# Patient Record
Sex: FEMALE | Race: OTHER | Employment: UNEMPLOYED | URBAN - METROPOLITAN AREA
[De-identification: names, ages, dates, MRNs, and addresses within clinical notes are randomized per-mention and may not be internally consistent; named-entity substitution may affect disease eponyms.]

---

## 2019-05-02 ENCOUNTER — HOSPITAL ENCOUNTER (OUTPATIENT)
Dept: CT IMAGING | Age: 49
Discharge: HOME | End: 2019-05-02
Payer: COMMERCIAL

## 2019-05-02 DIAGNOSIS — R51.9 CHRONIC HEADACHES: ICD-10-CM

## 2019-05-02 DIAGNOSIS — G89.29 CHRONIC HEADACHES: ICD-10-CM

## 2019-05-02 PROCEDURE — 70450 CT HEAD/BRAIN W/O DYE: CPT

## 2021-10-17 ENCOUNTER — HOSPITAL ENCOUNTER (OUTPATIENT)
Dept: PREADMISSION TESTING | Age: 51
Discharge: HOME | End: 2021-10-17
Payer: COMMERCIAL

## 2021-10-17 LAB — SARS-COV-2, COV2: NORMAL

## 2021-10-17 PROCEDURE — U0005 INFEC AGEN DETEC AMPLI PROBE: HCPCS

## 2021-10-18 LAB
SARS-COV-2, XPLCVT: NOT DETECTED
SOURCE, COVRS: NORMAL

## 2021-10-21 ENCOUNTER — ANESTHESIA (OUTPATIENT)
Dept: SURGERY | Age: 51
End: 2021-10-21
Payer: COMMERCIAL

## 2021-10-21 ENCOUNTER — HOSPITAL ENCOUNTER (OUTPATIENT)
Age: 51
Discharge: HOME | End: 2021-10-21
Attending: SURGERY | Admitting: SURGERY
Payer: COMMERCIAL

## 2021-10-21 ENCOUNTER — ANESTHESIA EVENT (OUTPATIENT)
Dept: SURGERY | Age: 51
End: 2021-10-21
Payer: COMMERCIAL

## 2021-10-21 VITALS
TEMPERATURE: 98.2 F | RESPIRATION RATE: 16 BRPM | HEART RATE: 58 BPM | DIASTOLIC BLOOD PRESSURE: 84 MMHG | SYSTOLIC BLOOD PRESSURE: 126 MMHG | BODY MASS INDEX: 26.43 KG/M2 | OXYGEN SATURATION: 98 % | WEIGHT: 140 LBS | HEIGHT: 61 IN

## 2021-10-21 PROBLEM — D49.2 SKIN NEOPLASM: Status: ACTIVE | Noted: 2021-10-21

## 2021-10-21 LAB — HCG UR QL: NEGATIVE

## 2021-10-21 PROCEDURE — 2709999900 HC NON-CHARGEABLE SUPPLY: Performed by: SURGERY

## 2021-10-21 PROCEDURE — 74011000250 HC RX REV CODE- 250: Performed by: SURGERY

## 2021-10-21 PROCEDURE — 76210000006 HC OR PH I REC 0.5 TO 1 HR: Performed by: SURGERY

## 2021-10-21 PROCEDURE — 74011000250 HC RX REV CODE- 250: Performed by: NURSE ANESTHETIST, CERTIFIED REGISTERED

## 2021-10-21 PROCEDURE — 76060000032 HC ANESTHESIA 0.5 TO 1 HR: Performed by: SURGERY

## 2021-10-21 PROCEDURE — 76210000021 HC REC RM PH II 0.5 TO 1 HR: Performed by: SURGERY

## 2021-10-21 PROCEDURE — 76010000138 HC OR TIME 0.5 TO 1 HR: Performed by: SURGERY

## 2021-10-21 PROCEDURE — 88305 TISSUE EXAM BY PATHOLOGIST: CPT

## 2021-10-21 PROCEDURE — 74011250636 HC RX REV CODE- 250/636: Performed by: SURGERY

## 2021-10-21 PROCEDURE — 74011250636 HC RX REV CODE- 250/636: Performed by: NURSE ANESTHETIST, CERTIFIED REGISTERED

## 2021-10-21 PROCEDURE — 77030013629 HC ELECTRD NDL STRY -B: Performed by: SURGERY

## 2021-10-21 PROCEDURE — 77030040361 HC SLV COMPR DVT MDII -B: Performed by: SURGERY

## 2021-10-21 PROCEDURE — 77030031139 HC SUT VCRL2 J&J -A: Performed by: SURGERY

## 2021-10-21 PROCEDURE — 81025 URINE PREGNANCY TEST: CPT

## 2021-10-21 PROCEDURE — 77030002916 HC SUT ETHLN J&J -A: Performed by: SURGERY

## 2021-10-21 RX ORDER — EPHEDRINE SULFATE/0.9% NACL/PF 50 MG/5 ML
5 SYRINGE (ML) INTRAVENOUS AS NEEDED
Status: CANCELLED | OUTPATIENT
Start: 2021-10-21

## 2021-10-21 RX ORDER — SODIUM CHLORIDE, SODIUM LACTATE, POTASSIUM CHLORIDE, CALCIUM CHLORIDE 600; 310; 30; 20 MG/100ML; MG/100ML; MG/100ML; MG/100ML
20 INJECTION, SOLUTION INTRAVENOUS CONTINUOUS
Status: DISCONTINUED | OUTPATIENT
Start: 2021-10-21 | End: 2021-10-21 | Stop reason: HOSPADM

## 2021-10-21 RX ORDER — SODIUM CHLORIDE 0.9 % (FLUSH) 0.9 %
5-40 SYRINGE (ML) INJECTION AS NEEDED
Status: DISCONTINUED | OUTPATIENT
Start: 2021-10-21 | End: 2021-10-21 | Stop reason: HOSPADM

## 2021-10-21 RX ORDER — DIPHENHYDRAMINE HYDROCHLORIDE 50 MG/ML
12.5 INJECTION, SOLUTION INTRAMUSCULAR; INTRAVENOUS AS NEEDED
Status: CANCELLED | OUTPATIENT
Start: 2021-10-21 | End: 2021-10-21

## 2021-10-21 RX ORDER — FENTANYL CITRATE 50 UG/ML
INJECTION, SOLUTION INTRAMUSCULAR; INTRAVENOUS AS NEEDED
Status: DISCONTINUED | OUTPATIENT
Start: 2021-10-21 | End: 2021-10-21 | Stop reason: HOSPADM

## 2021-10-21 RX ORDER — ONDANSETRON 2 MG/ML
4 INJECTION INTRAMUSCULAR; INTRAVENOUS AS NEEDED
Status: CANCELLED | OUTPATIENT
Start: 2021-10-21

## 2021-10-21 RX ORDER — HYDROMORPHONE HYDROCHLORIDE 1 MG/ML
0.4 INJECTION, SOLUTION INTRAMUSCULAR; INTRAVENOUS; SUBCUTANEOUS
Status: CANCELLED | OUTPATIENT
Start: 2021-10-21

## 2021-10-21 RX ORDER — LIDOCAINE HYDROCHLORIDE 20 MG/ML
INJECTION, SOLUTION EPIDURAL; INFILTRATION; INTRACAUDAL; PERINEURAL AS NEEDED
Status: DISCONTINUED | OUTPATIENT
Start: 2021-10-21 | End: 2021-10-21 | Stop reason: HOSPADM

## 2021-10-21 RX ORDER — SODIUM CHLORIDE 0.9 % (FLUSH) 0.9 %
5-40 SYRINGE (ML) INJECTION EVERY 8 HOURS
Status: CANCELLED | OUTPATIENT
Start: 2021-10-21

## 2021-10-21 RX ORDER — SODIUM CHLORIDE, SODIUM LACTATE, POTASSIUM CHLORIDE, CALCIUM CHLORIDE 600; 310; 30; 20 MG/100ML; MG/100ML; MG/100ML; MG/100ML
INJECTION, SOLUTION INTRAVENOUS
Status: DISCONTINUED | OUTPATIENT
Start: 2021-10-21 | End: 2021-10-21 | Stop reason: HOSPADM

## 2021-10-21 RX ORDER — MIDAZOLAM HYDROCHLORIDE 1 MG/ML
0.5 INJECTION, SOLUTION INTRAMUSCULAR; INTRAVENOUS
Status: CANCELLED | OUTPATIENT
Start: 2021-10-21

## 2021-10-21 RX ORDER — FENTANYL CITRATE 50 UG/ML
50 INJECTION, SOLUTION INTRAMUSCULAR; INTRAVENOUS
Status: CANCELLED | OUTPATIENT
Start: 2021-10-21

## 2021-10-21 RX ORDER — BUPIVACAINE HYDROCHLORIDE 2.5 MG/ML
INJECTION, SOLUTION EPIDURAL; INFILTRATION; INTRACAUDAL AS NEEDED
Status: DISCONTINUED | OUTPATIENT
Start: 2021-10-21 | End: 2021-10-21 | Stop reason: HOSPADM

## 2021-10-21 RX ORDER — FENTANYL CITRATE 50 UG/ML
50 INJECTION, SOLUTION INTRAMUSCULAR; INTRAVENOUS AS NEEDED
Status: DISCONTINUED | OUTPATIENT
Start: 2021-10-21 | End: 2021-10-21 | Stop reason: HOSPADM

## 2021-10-21 RX ORDER — SODIUM CHLORIDE 0.9 % (FLUSH) 0.9 %
5-40 SYRINGE (ML) INJECTION AS NEEDED
Status: CANCELLED | OUTPATIENT
Start: 2021-10-21

## 2021-10-21 RX ORDER — SODIUM CHLORIDE 0.9 % (FLUSH) 0.9 %
5-40 SYRINGE (ML) INJECTION EVERY 8 HOURS
Status: DISCONTINUED | OUTPATIENT
Start: 2021-10-21 | End: 2021-10-21 | Stop reason: HOSPADM

## 2021-10-21 RX ORDER — PROPOFOL 10 MG/ML
INJECTION, EMULSION INTRAVENOUS
Status: DISCONTINUED | OUTPATIENT
Start: 2021-10-21 | End: 2021-10-21 | Stop reason: HOSPADM

## 2021-10-21 RX ORDER — MIDAZOLAM HYDROCHLORIDE 1 MG/ML
INJECTION, SOLUTION INTRAMUSCULAR; INTRAVENOUS AS NEEDED
Status: DISCONTINUED | OUTPATIENT
Start: 2021-10-21 | End: 2021-10-21 | Stop reason: HOSPADM

## 2021-10-21 RX ORDER — HYDROCODONE BITARTRATE AND ACETAMINOPHEN 5; 325 MG/1; MG/1
1 TABLET ORAL AS NEEDED
Status: CANCELLED | OUTPATIENT
Start: 2021-10-21

## 2021-10-21 RX ORDER — MIDAZOLAM HYDROCHLORIDE 1 MG/ML
1 INJECTION, SOLUTION INTRAMUSCULAR; INTRAVENOUS AS NEEDED
Status: DISCONTINUED | OUTPATIENT
Start: 2021-10-21 | End: 2021-10-21 | Stop reason: HOSPADM

## 2021-10-21 RX ORDER — LIDOCAINE HYDROCHLORIDE 10 MG/ML
0.1 INJECTION, SOLUTION EPIDURAL; INFILTRATION; INTRACAUDAL; PERINEURAL AS NEEDED
Status: DISCONTINUED | OUTPATIENT
Start: 2021-10-21 | End: 2021-10-21 | Stop reason: HOSPADM

## 2021-10-21 RX ADMIN — LIDOCAINE HYDROCHLORIDE 100 MG: 20 INJECTION, SOLUTION EPIDURAL; INFILTRATION; INTRACAUDAL; PERINEURAL at 08:08

## 2021-10-21 RX ADMIN — PROPOFOL 100 MCG/KG/MIN: 10 INJECTION, EMULSION INTRAVENOUS at 08:08

## 2021-10-21 RX ADMIN — PROPOFOL 50 MG: 10 INJECTION, EMULSION INTRAVENOUS at 08:12

## 2021-10-21 RX ADMIN — MIDAZOLAM HYDROCHLORIDE 2 MG: 2 INJECTION, SOLUTION INTRAMUSCULAR; INTRAVENOUS at 08:08

## 2021-10-21 RX ADMIN — FENTANYL CITRATE 50 MCG: 0.05 INJECTION, SOLUTION INTRAMUSCULAR; INTRAVENOUS at 08:12

## 2021-10-21 RX ADMIN — SODIUM CHLORIDE, POTASSIUM CHLORIDE, SODIUM LACTATE AND CALCIUM CHLORIDE: 600; 310; 30; 20 INJECTION, SOLUTION INTRAVENOUS at 08:04

## 2021-10-21 RX ADMIN — CEFAZOLIN SODIUM 2 G: 1 INJECTION, POWDER, FOR SOLUTION INTRAMUSCULAR; INTRAVENOUS at 08:15

## 2021-10-21 NOTE — ANESTHESIA POSTPROCEDURE EVALUATION
Procedure(s):  EXCISION RIGHT KNEE SKIN NEOPLASM (MAC/LOCAL).     total IV anesthesia, MAC    Anesthesia Post Evaluation      Multimodal analgesia: multimodal analgesia used between 6 hours prior to anesthesia start to PACU discharge  Patient location during evaluation: PACU  Patient participation: complete - patient participated  Level of consciousness: awake  Pain score: 0  Pain management: adequate  Airway patency: patent  Anesthetic complications: no  Cardiovascular status: acceptable  Respiratory status: acceptable  Hydration status: acceptable  Post anesthesia nausea and vomiting:  controlled  Final Post Anesthesia Temperature Assessment:  Normothermia (36.0-37.5 degrees C)      INITIAL Post-op Vital signs:   Vitals Value Taken Time   /67 10/21/21 0850   Temp 36.8 °C (98.2 °F) 10/21/21 0845   Pulse 59 10/21/21 0850   Resp 16 10/21/21 0850   SpO2 96 % 10/21/21 0850

## 2021-10-21 NOTE — DISCHARGE INSTRUCTIONS
Patient Education   Patient Education   Patient Education   Patient Education        Infección después de joseph Yosvany Niece: Instrucciones de cuidado  Infection After Surgery: Care Instructions  Instrucciones de cuidado  Después de [facility], siempre es posible joseph infección. No quiere decir que la cirugía no haya salido basia. Dado que joseph infección puede ser grave, parra médico ha tomado medidas para controlarla. Parra médico evaluó la infección y la limpió si era necesario. Puede velma hecho joseph abertura en la varinder para que pueda drenar el pus. Jose vez tenga gasa en el tawana para que la varinder se mantenga abierta y siga drenando. Es posible que necesite antibióticos. Será necesario que zeynep seguimiento con parra médico para asegurarse de que la infección haya desaparecido. La atención de seguimiento es joseph parte clave de parra tratamiento y seguridad. Asegúrese de hacer y acudir a todas las citas, y llame a parra médico si está teniendo problemas. También es joseph buena idea saber los resultados de ezra exámenes y mantener joseph lista de los medicamentos que rachel. ¿Cómo puede cuidarse en el hogar? · Asegúrese de que parra cirujano sepa que consultó a un médico sobre la infección. · Si parra médico le recetó antibióticos, tómelos según las indicaciones. No deje de tomarlos solo porque se sienta mejor. Debe kirt todos los antibióticos hasta terminarlos. · Pregúntele a parra médico si puede kirt un analgésico (medicamento para el dolor) de venta pete, skyler acetaminofén (Tylenol), ibuprofeno (Advil, Motrin) o naproxeno (Aleve). Sea britany con los medicamentos. Deisy y siga todas las instrucciones de la [place]. · No tome dos o más analgésicos al mismo tiempo a menos que el médico se lo haya indicado. Muchos analgésicos contienen acetaminofén, lo cual es Tylenol. El exceso de acetaminofén (Tylenol) puede ser dañino. · Eleve la varinder sobre joseph almohada siempre que se siente o se acueste eron los próximos 3 días.  Trate de mantenerla por encima del nivel del corazón. [place] ayudará a reducir la hinchazón. · Mantenga la piel limpia y seca. · Si tiene joseph venda, manténgala limpia y [place]. · Es posible que tenga un vendaje sobre el tawana (incisión). Un vendaje ayuda a cicatrizar la incisión y la protege. Parra médico le indicará cómo cuidarlo. Puede esperar supuración de la herida. · Si parra médico le dijo cómo cuidarse la incisión, siga las instrucciones de parra médico. Si no le janessa instrucciones, siga estos consejos generales:  ? Lávese la varinder alrededor de la incisión con agua limpia 2 veces al día. No use peróxido de hidrógeno (agua [place]) ni alcohol, los cuales pueden retrasar la sanación. ¿Cuándo debe pedir ayuda? Llame a parra médico ahora mismo o busque atención médica inmediata si:    · Tiene señales de que parra infección está empeorando, tales skyler:  ? Mayor dolor, hinchazón, calor o enrojecimiento en la varinder. ? Vetas rojizas que salen de la varinder. ? Pus que sale de la herida. ? Fiebre nueva o más jeffy. Vigile muy de cerca los cambios en parra yasmin, y asegúrese de comunicarse con parra médico si tiene algún problema. ¿Dónde puede encontrar más información en inglés? Vaya a http://www.gray.com/  Escriba C340 en la búsqueda para aprender más acerca de \"Infección después de joseph cirugía: Instrucciones de cuidado. \"  Revisado: 1 julio, 0963               OQKHXAW del contenido: 13.0  © 2006-2021 Healthwise, Incorporated. Las instrucciones de cuidado fueron adaptadas bajo licencia por [employer] (which disclaims liability or warranty for this information). Si usted tiene [place] afección médica o sobre estas instrucciones, siempre pregunte a parra profesional de yasmin. [facility] niega toda garantía o responsabilidad por parra uso de esta información.             Aprenda sobre la anestesia general  Learning About General Anesthesia  ¿Qué es la anestesia general?     La anestesia ayuda a controlar el dolor eron la cirugía u otros procedimientos. La anestesia general afecta todo el cuerpo. Utiliza medicamentos que lo cristiane inconsciente. También hace que se olvide cosas eron un tiempo breve. Y relaja ezra músculos. Cuando se [place], usted debería estar completamente inconsciente. No sentirá dolor eron la cirugía o el procedimiento. La anestesia general reduce muchas de las funciones normales del cuerpo, skyler las que controlan la respiración. Por tariq motivo, el especialista en anestesia lo observará de cerca eron la cirugía. ¿Cómo se administra? La anestesia se puede administrar a través de [place] en joseph vena (por vía intravenosa, o IV) o se puede inhalar. A veces se administra de ambas formas. Eron el procedimiento, un especialista en anestesia supervisa atentamente las constantes vitales y las mantiene estables ajustando los medicamentos según sea necesario. Las constantes vitales incluyen los latidos cardíacos, la respiración y la presión arterial.  ¿Cuáles son los riesgos? No es común tener efectos secundarios significativos. Timothy todos los tipos de anestesia tienen algún riesgo. Parra riesgo depende de parra estado de yasmin general. También depende del tipo de anestesia que le administren y de cómo reaccione usted a heriberto. Los riesgos graves timothy poco frecuentes incluyen problemas respiratorios, problemas cardíacos y ataque cerebral. La hipertermia maligna es joseph reacción grave que, en casos muy poco frecuentes, puede producirse con algunos medicamentos anestésicos. Puede ser mortal. La probabilidad de tener esta reacción puede ser hereditaria. Algunas afecciones de yasmin aumentan el riesgo de [place]. Parra especialista en anestesia le preguntará sobre cualquier problema de yasmin que tenga. Hablará con usted sobre cosas que pueden aumentar parra riesgo. Estas incluyen apnea del sueño, obesidad y enfermedades cardíacas y pulmonares. ¿Qué puede hacer para prepararse?   Recibirá [employer] de instrucciones que le ayudarán a prepararse. El especialista en anestesia le informará sobre qué esperar cuando llegue al hospital, eron la cirugía y después de [facility]. Se le dirá cuándo debe dejar de comer y beber. Si rachel medicamentos, le dirán cuáles puede y no puede kirt antes de la [place]. Parra especialista en anestesia hablará con usted sobre los riesgos y beneficios de la anestesia. Si tiene preguntas, no dude en hacerlas. Muchas personas se ponen nerviosas antes de someterse a anestesia y [place]. Pregúntele a parra médico acerca de maneras de relajarse con antelación. Los ejercicios de relajación pueden ser joseph opción. ¿Qué puede esperar después de recibir anestesia general?  Matthew después de la operación, estará en la roxy de recuperación. El personal de enfermería se asegurará de que no tenga molestias. A medida que el efecto de la anestesia desaparece, podría sentir algo de dolor y [place] debido a la [place]. Informe a alguien de parra equipo de atención si tiene dolor. Los analgésicos funcionan mejor si los rachel antes de que el dolor empeore. En cuanto se despierte de la anestesia, podría estar confuso. Puede tener dificultades para pensar claramente. [place] es normal. Hace falta tiempo hasta que los efectos del medicamento desaparezcan por completo. Espere 24 horas antes de conducir u operar maquinaria pesada, kirt [employer], ir a trabajar o a la escuela o firmar documentos legales. Otros efectos secundarios comunes de la anestesia incluyen:  · Náuseas y vómito. Estos no suelen durar por [employer]. Puede kirt medicamentos para tratarlos. · Joseph ligera bajada de la temperatura corporal. Podría sentir frío y escalofríos al despertarse. · Dolor de garganta. · Debilidad o yolanda musculares. · Sensación de cansancio. Para operaciones menores, mallika vez vuelva a parra casa el mismo día.  Para otras operaciones, podría permanecer en el hospital. El especialista en anestesia lo supervisará mientras se recupera de la anestesia. Puede responder cualquier pregunta que tenga. La atención de seguimiento es joseph parte clave de parra tratamiento y seguridad. Asegúrese de hacer y acudir a todas las citas, y llame a parra médico si está teniendo problemas. También es joseph buena idea saber los resultados de ezra exámenes y mantener joseph lista de los medicamentos que rachel. ¿Dónde puede encontrar más información en inglés? Vaya a http://www.[facility]/  Lawerance Hiss F599 en la búsqueda para aprender más acerca de \"Aprenda sobre la anestesia general.\"  Revisado: 23 junio, 2021               Versión del contenido: 13.0  © 2088-0543 Healthwise, Incorporated. Las instrucciones de cuidado fueron adaptadas bajo licencia por [employer] (which disclaims liability or warranty for this information). Si usted tiene [place] afección médica o sobre estas instrucciones, siempre pregunte a parra profesional de yasmin. Healthwise, Incorporated niega toda garantía o responsabilidad por parra uso de esta información. Infection After Surgery: Care Instructions  Your Care Instructions  After surgery, an infection is always possible. It doesn't mean that the surgery didn't go well. Because an infection can be serious, your doctor has taken steps to manage it. Your doctor checked the infection and cleaned it if necessary. He or she may have made an opening in the area so that the pus can drain out. You may have gauze in the cut so that the area will stay open and keep draining. You may need antibiotics. You will need to follow up with your doctor to make sure the infection has gone away. Follow-up care is a key part of your treatment and safety. Be sure to make and go to all appointments, and call your doctor if you are having problems. It's also a good idea to know your test results and keep a list of the medicines you take. How can you care for yourself at home?   · Make sure your surgeon knows that you saw a doctor about the infection. · If your doctor prescribed antibiotics, take them as directed. Do not stop taking them just because you feel better. You need to take the full course of antibiotics. · Ask your doctor if you can take an over-the-counter pain medicine, such as acetaminophen (Tylenol), ibuprofen (Advil, Motrin), or naproxen (Aleve). Be safe with medicines. Read and follow all instructions on the label. · Do not take two or more pain medicines at the same time unless the doctor told you to. Many pain medicines have acetaminophen, which is Tylenol. Too much acetaminophen (Tylenol) can be harmful. · Prop up the area on a pillow anytime you sit or lie down during the next 3 days. Try to keep it above the level of your heart. This will help reduce swelling. · Keep the skin clean and dry. · If you have a bandage, keep it clean and dry. · You may have a dressing over the cut (incision). A dressing helps the incision heal and protects it. Your doctor will tell you how to take care of this. You can expect drainage from the wound. · If your doctor told you how to care for your incision, follow your doctor's instructions. If you did not get instructions, follow this general advice:  ? Wash around the incision with clean water 2 times a day. Don't use hydrogen peroxide or alcohol, which can slow healing. When should you call for help? Call your doctor now or seek immediate medical care if:    · You have signs that your infection is getting worse, such as:  ? Increased pain, swelling, warmth, or redness in the area. ? Red streaks leading from the area. ? Pus draining from the wound. ? A new or higher fever. Watch closely for changes in your health, and be sure to contact your doctor if you have any problems. Where can you learn more?   Go to http://www.gray.com/  Enter Z485 in the search box to learn more about \"Infection After Surgery: Care Instructions. \"  Current as of: July 1, 2021               Content Version: 13.0  © 1125-8924 [employer]. Care instructions adapted under license by [employer] (which disclaims liability or warranty for this information). If you have questions about a medical condition or this instruction, always ask your healthcare professional. [address] any warranty or liability for your use of this information. Learning About General Anesthesia  What is general anesthesia? Anesthesia helps control pain during surgery or other procedures. General anesthesia affects your whole body. It uses medicines that make you unconscious. It also causes you to forget things for a short time. And it relaxes your muscles. When it's used, you should be completely unaware. You won't feel pain during the surgery or procedure. General anesthesia reduces many of your body's normal functions, such as those that control breathing. So the anesthesia specialist will watch you closely during the surgery. How is it given? Anesthesia medicine may be given through a needle in a vein (intravenous, or IV.) Or it may be inhaled. Sometimes it's given both ways. During the procedure, an anesthesia specialist closely watches your vital signs and keeps them stable by adjusting the medicines as needed. Vital signs include your heartbeat, breathing, and blood pressure. What are the risks? Major side effects are not common. But all types of anesthesia have some risk. Your risk depends on your overall health. It also depends on the type of anesthesia you have and how you respond to it. Serious but rare risks include breathing problems, heart problems, and stroke. Malignant hyperthermia is a serious reaction that can occur very rarely with some anesthesia medicines. It can be deadly. The chance of having this reaction may be passed down in families.   Some health conditions increase the risk of problems. Your anesthesia specialist will ask you about any health problems you have. You will discuss things that can increase your risk. These include sleep apnea, obesity, and heart and lung disease. What can you do to prepare? You will get a list of instructions to help you prepare. Your anesthesia specialist will let you know what to expect when you get to the hospital, during the surgery, and after. You'll be told when to stop eating and drinking. If you take medicine, you'll be told what you can and can't take before surgery. Your anesthesia specialist will talk with you about the risks and benefits of anesthesia. If you have questions, be sure to ask. Many people are nervous before they have anesthesia and surgery. Ask your doctor about ways to relax ahead of time. Relaxation exercises may be one option. What can you expect after having general anesthesia? Right after the surgery, you will be in the recovery room. Nurses will make sure you are comfortable. As the anesthesia wears off, you may feel some pain and discomfort from your surgery. Tell someone on your care team if you have pain. Pain medicine works better if you take it before the pain gets bad. When you first wake up from the anesthesia, you may be confused. It may be hard to think clearly. This is normal. It takes time for the effects of the medicine to completely wear off. Wait 24 hours before you drive or operate heavy machinery, make important decisions, go to work or school, or sign legal documents. Other common side effects of anesthesia include:  · Nausea and vomiting. This usually won't last long. You can take medicine to treat it. · A slight drop in body temperature. You may feel cold and shiver when you first wake up. · A sore throat. · Muscle aches or weakness. · Feeling tired. For minor surgeries, you may go home the same day.  For other surgeries, you may stay in the hospital. Your anesthesia specialist will check on you as you recover from the anesthesia. They can answer any questions you may have. Follow-up care is a key part of your treatment and safety. Be sure to make and go to all appointments, and call your doctor if you are having problems. It's also a good idea to know your test results and keep a list of the medicines you take. Where can you learn more? Go to http://www.gray.com/  Enter V817 in the search box to learn more about \"Learning About General Anesthesia. \"  Current as of: June 23, 2021               Content Version: 13.0  © 5946-7224 Healthwise, "[employer] Care instructions adapted under license by [employer] (which disclaims liability or warranty for this information). If you have questions about a medical condition or this instruction, always ask your healthcare professional. [address] any warranty or liability for your use of this information.

## 2021-10-21 NOTE — OP NOTES
OPERATIVE NOTE    Patient: Vinie Paget  YOB: 1970  MRN: [id number]    Date of Procedure: 10/21/2021     Pre-Op Diagnosis: RIGHT KNEE SKIN NEOPLASM (2cm)    Post-Op Diagnosis: Same as preoperative diagnosis. Procedure(s):  EXCISION RIGHT KNEE SKIN NEOPLASM (MAC/LOCAL)    Surgeon(s):  Sherry Lott MD    Surgical Assistant: Surg Asst-1: Ms. Bernie Zamora    Anesthesia: MAC/local    Anesthesiologist: Dr. Gentry Robles    CRNA: Ms. Aminata Fortune    Indication:  She presented to my office with history of right knee growth for the past few months associated some pain and discomfort. This was suspicious for skin neoplasm of uncertain behavior. Hence I recommend excision of the same. Risk-benefit complications were discussed and consent obtained. Procedure:  Patient was taken to the operative room. Patient was laid supine. Patient received prophylactic dose of antibiotic. Patient had SCD applied to both lower extremities. Right knee area was prepped and draped usual sterile fashion. Timeout was completed. Local anesthesia was infiltrated. An elliptical incision was placed in a transverse fashion along the skin crease line of the right knee. The incision was deepened through subcutaneous tissue. Anterior skin neoplasm was completely excised. The depth of the incision extended up to the subcutaneous tissue. Complete hemostasis achieved. Subtenons tissues were closed using 3-0 Vicryl simple stitches. Skin was approximated using 3-0 nylon simple interrupted stitches. Clean dry dressing was applied. Patient tolerated procedure very well. There were no complication. Estimated blood loss was minimal.    Patient was transferred to PACU in a stable condition. All counts are correct.     Estimated Blood Loss (mL): Minimal    Complications: None    Specimens:   ID Type Source Tests Collected by Time Destination   1 : Right Knee Skin Neoplasm Preservative Knee, right  Sherry Lott MD 10/21/2021 3842 Pathology        Implants: None    Drains: None    Findings: as above    Electronically Signed by Ruthy Mcfadden MD on 10/21/2021 at 8:42 AM

## 2021-10-21 NOTE — ANESTHESIA PREPROCEDURE EVALUATION
Relevant Problems   No relevant active problems       Anesthetic History   No history of anesthetic complications            Review of Systems / Medical History  Patient summary reviewed, nursing notes reviewed and pertinent labs reviewed    Pulmonary  Within defined limits                 Neuro/Psych         Headaches     Cardiovascular      Valvular problems/murmurs (mild to moderate; no symptoms): tricuspid insufficiency        Hyperlipidemia    Exercise tolerance: >4 METS  Comments: TTE 2014:    Left ventricle: Ejection fraction was estimated to be 60 %. Doppler   parameters were consistent with abnormal left ventricular relaxation   (grade 1 diastolic dysfunction). Tricuspid valve: There was mild to moderate regurgitation.     GI/Hepatic/Renal     GERD: well controlled           Endo/Other  Within defined limits           Other Findings              Physical Exam    Airway  Mallampati: III  TM Distance: 4 - 6 cm  Neck ROM: normal range of motion   Mouth opening: Normal     Cardiovascular    Rhythm: regular  Rate: normal    Murmur     Dental  No notable dental hx       Pulmonary  Breath sounds clear to auscultation               Abdominal  GI exam deferred       Other Findings            Anesthetic Plan    ASA: 2  Anesthesia type: total IV anesthesia and MAC          Induction: Intravenous  Anesthetic plan and risks discussed with: Patient and Family

## 2021-10-21 NOTE — PROGRESS NOTES
DC instructions reviewed with pt. Verb an understanding. Dressing to rt knee intact and dry. No redness or swelling at the site. Pt denies any complaint of pain. Escorted out via wheelchair to front entrance for dc home with family.

## (undated) DEVICE — MICRODISSECTION NEEDLE STRAIGHT SLEEVE: Brand: COLORADO

## (undated) DEVICE — SOLUTION IRRIG 500ML 0.9% SOD CHL USP POUR PLAS BTL

## (undated) DEVICE — BASIC SINGLE BASIN-LF: Brand: MEDLINE INDUSTRIES, INC.

## (undated) DEVICE — NEEDLE HYPO 25GA L1.5IN BVL ORIENTED ECLIPSE

## (undated) DEVICE — SUT ETHLN 3-0 18IN FS1 BLK --

## (undated) DEVICE — MINOR GENERAL PACK: Brand: MEDLINE INDUSTRIES, INC.

## (undated) DEVICE — SOUTHSIDE TURNOVER: Brand: MEDLINE INDUSTRIES, INC.

## (undated) DEVICE — SYR 10ML LUER LOK 1/5ML GRAD --

## (undated) DEVICE — INTENDED FOR TISSUE SEPARATION, AND OTHER PROCEDURES THAT REQUIRE A SHARP SURGICAL BLADE TO PUNCTURE OR CUT.: Brand: BARD-PARKER ® CARBON RIB-BACK BLADES

## (undated) DEVICE — GLOVE SURG SZ 7 L12IN FNGR THK79MIL GRN LTX FREE

## (undated) DEVICE — SYRINGE IRRIG 60ML SFT PLIABLE BLB EZ TO GRP 1 HND USE W/

## (undated) DEVICE — 3-0 COATED VICRYL PLUS UNDYED 1X27" SH --

## (undated) DEVICE — SPONGE GZ W4XL4IN COT 12 PLY TYP VII WVN C FLD DSGN

## (undated) DEVICE — PREP SKN CHLRAPRP APL 26ML STR --

## (undated) DEVICE — DRAPE,REIN 53X77,STERILE: Brand: MEDLINE

## (undated) DEVICE — 3M™ TEGADERM™ TRANSPARENT FILM DRESSING FIRST AID STYLE, 1621, 4 IN X 4-3/4 IN, 50 BAGS/CARTON, 4 CARTONS/CASE: Brand: 3M™ TEGADERM™

## (undated) DEVICE — PREP PAD BNS: Brand: CONVERTORS

## (undated) DEVICE — GLOVE ORANGE PI 7   MSG9070

## (undated) DEVICE — 3M™ STERI-DRAPE™ SMALL DRAPE WITH ADHESIVE APERTURE 1092 25/BX,4/CS&#X20;: Brand: STERI-DRAPE™

## (undated) DEVICE — GARMENT,MEDLINE,DVT,INT,CALF,MED, GEN2: Brand: MEDLINE

## (undated) DEVICE — TOWEL SURG W17XL27IN STD BLU COT NONFENESTRATED PREWASHED